# Patient Record
Sex: MALE | Race: OTHER | NOT HISPANIC OR LATINO
[De-identification: names, ages, dates, MRNs, and addresses within clinical notes are randomized per-mention and may not be internally consistent; named-entity substitution may affect disease eponyms.]

---

## 2017-02-07 ENCOUNTER — APPOINTMENT (OUTPATIENT)
Dept: ORTHOPEDIC SURGERY | Facility: CLINIC | Age: 22
End: 2017-02-07

## 2018-06-24 ENCOUNTER — EMERGENCY (EMERGENCY)
Facility: HOSPITAL | Age: 23
LOS: 1 days | Discharge: HOME | End: 2018-06-24
Attending: EMERGENCY MEDICINE | Admitting: EMERGENCY MEDICINE
Payer: MEDICAID

## 2018-06-24 VITALS
DIASTOLIC BLOOD PRESSURE: 55 MMHG | SYSTOLIC BLOOD PRESSURE: 119 MMHG | OXYGEN SATURATION: 98 % | RESPIRATION RATE: 16 BRPM | TEMPERATURE: 98 F | HEART RATE: 60 BPM

## 2018-06-24 PROCEDURE — 99283 EMERGENCY DEPT VISIT LOW MDM: CPT

## 2018-06-24 PROCEDURE — 73140 X-RAY EXAM OF FINGER(S): CPT | Mod: 26,RT

## 2018-06-24 RX ORDER — IBUPROFEN 200 MG
600 TABLET ORAL ONCE
Qty: 0 | Refills: 0 | Status: COMPLETED | OUTPATIENT
Start: 2018-06-24 | End: 2018-06-24

## 2018-06-24 RX ADMIN — Medication 600 MILLIGRAM(S): at 19:59

## 2018-06-24 NOTE — ED PROVIDER NOTE - PROGRESS NOTE DETAILS
LEONARDO Hackett: Received sign out from LEONARDO OATES to reassess patient and follow up on xr reports. XR w/o acute fx or dislocation, ace wrap applied. Pt given Motrin. Will dc w/ PMD and hand follow up if pain persists.

## 2018-06-24 NOTE — ED PROVIDER NOTE - CARE PLAN
Principal Discharge DX:	Thumb sprain  Assessment and plan of treatment:	See your primary care doctor within 24-48 hours. Follow up with hand for further evaluation if pain persists. May call the Admin PA for you results from 9-2:30, 7 days a week, at [phone]. Take Motrin 600mg every 8hrs with food for pain. Keep ace wrap on for comfort. Rest, ice and elevate. Return to the ER for worsening symptoms or any other concerns.

## 2018-06-24 NOTE — ED PROVIDER NOTE - OBJECTIVE STATEMENT
22y M with no PMHx presents to the ED for right thumb pain today. Pt states he was playing basketball when ball hit right thumb. No allergies

## 2018-06-24 NOTE — ED PROVIDER NOTE - PLAN OF CARE
See your primary care doctor within 24-48 hours. Follow up with hand for further evaluation if pain persists. May call the Admin PA for you results from 9-2:30, 7 days a week, at [phone]. Take Motrin 600mg every 8hrs with food for pain. Keep ace wrap on for comfort. Rest, ice and elevate. Return to the ER for worsening symptoms or any other concerns.

## 2019-02-15 ENCOUNTER — EMERGENCY (EMERGENCY)
Facility: HOSPITAL | Age: 24
LOS: 1 days | Discharge: HOME | End: 2019-02-15
Attending: PERSONAL EMERGENCY RESPONSE ATTENDANT | Admitting: PERSONAL EMERGENCY RESPONSE ATTENDANT
Payer: COMMERCIAL

## 2019-02-15 VITALS
TEMPERATURE: 98 F | RESPIRATION RATE: 16 BRPM | HEART RATE: 78 BPM | DIASTOLIC BLOOD PRESSURE: 65 MMHG | OXYGEN SATURATION: 98 % | SYSTOLIC BLOOD PRESSURE: 145 MMHG

## 2019-02-15 PROCEDURE — 99283 EMERGENCY DEPT VISIT LOW MDM: CPT | Mod: 25

## 2019-02-15 PROCEDURE — 29125 APPL SHORT ARM SPLINT STATIC: CPT | Mod: RT

## 2019-02-15 PROCEDURE — 73130 X-RAY EXAM OF HAND: CPT | Mod: 26,RT

## 2019-02-15 RX ORDER — IBUPROFEN 200 MG
600 TABLET ORAL ONCE
Qty: 0 | Refills: 0 | Status: COMPLETED | OUTPATIENT
Start: 2019-02-15 | End: 2019-02-15

## 2019-02-15 RX ORDER — ACETAMINOPHEN 500 MG
975 TABLET ORAL ONCE
Qty: 0 | Refills: 0 | Status: COMPLETED | OUTPATIENT
Start: 2019-02-15 | End: 2019-02-15

## 2019-02-15 RX ADMIN — Medication 600 MILLIGRAM(S): at 17:07

## 2019-02-15 RX ADMIN — Medication 975 MILLIGRAM(S): at 17:07

## 2019-02-15 NOTE — ED PROVIDER NOTE - PHYSICAL EXAMINATION
Pt's R index finger is held extended in 90 degrees extension at MCP joint w/ inability to flex at PIP or DIP. Intact distal sensation and cap refill of this digit. No gross bony deformity. No other injuries sustained. Pt's tenderness localizes to proximal phalanx and distal metacarpal of the 2nd digit of his R hand. No skin openings or bruising. Mild soft tissue swelling noted over proximal phalanx. Unable to passively or actively range at the PIP. On further discussion, pt endorses history of (eligiment) of this digit proximal to current site of injury that leaves with inability to flex at the DIP at baseline. Pt's R index finger is held extended in 90 degrees extension at MCP joint w/ inability to flex at PIP or DIP. Intact distal sensation and cap refill of this digit. No gross bony deformity. No other injuries sustained. Pt's tenderness localizes to proximal phalanx and distal metacarpal of the 2nd digit of his R hand. No skin openings or bruising. Mild soft tissue swelling noted over proximal phalanx. Unable to passively or actively range at the PIP. On further discussion, pt endorses history of injury to 'ligament' of this digit proximal to current site of injury that leaves him with inability to flex at the DIP at baseline.

## 2019-02-15 NOTE — ED PROVIDER NOTE - ATTENDING CONTRIBUTION TO CARE
Agree with above.  HPI and initial care provided by me.  Pt signed out to PA for follow-up of imaging/poss hand consult.

## 2019-02-15 NOTE — ED PROVIDER NOTE - NSFOLLOWUPINSTRUCTIONS_ED_ALL_ED_FT
Follow up with your Primary Medical Doctor in 1-2 days.  Follow up with Hand surgeon in 1-2 days see attached list.  Rest.  Ice 3-4 x a day,  Elevated hand.  Keep splint clean and dry.  Take Ibuprofen 600mg orally every 8 hours as needed for pain take with food.  Return to the ER for any persistent/worsening or new symptoms numbness, tingling or any concerning symptoms.

## 2019-02-15 NOTE — ED PROVIDER NOTE - UPPER EXTREMITY EXAM, RIGHT
Pt's R index finger is held extended in 90 degrees extension at MCP joint w/ inability to flex at PIP or DIP. Intact distal sensation and cap refill of this digit. No gross bony deformity. No other injuries sustained. Pt's tenderness localizes to proximal phalanx and distal metacarpal of the 2nd digit of his R hand. No skin openings or bruising. Mild soft tissue swelling noted over proximal phalanx. Unable to passively or actively range at the PIP.

## 2019-02-15 NOTE — ED PROVIDER NOTE - OBJECTIVE STATEMENT
24 y/o M w/ no pertinent PMH, presents to ED c/o pain to R index finger s/p injury today s/p playing basketball approximately 1 hour ago. Pt's R index finger is held extended in 90 degrees extension at MCP joint w/ inability to flex at PIP or DIP. Intact distal sensation and cap refill of this digit. No gross bony deformity. No other injuries sustained. Pt's tenderness localizes to proximal phalanx and distal metacarpal of the 2nd digit of his R hand. No skin openings or bruising. Mild soft tissue swelling noted over proximal phalanx. Unable to passively or actively range at the PIP. On further discussion, pt endorses history of (ligament) of this digit proximal to current site of injury that leaves with inability to flex at the DIP at baseline. Pt is R handed. Has not taken any pain medication. Denies any other pain, or any other acute complaints. NKDA. No daily meds. 22 y/o M w/ no pertinent PMH, presents to ED c/o pain to R index finger s/p injury today s/p playing basketball approximately 1 hour ago. Pt's R index finger is held extended in 90 degrees extension at MCP joint w/ inability to flex at PIP or DIP. Intact distal sensation and cap refill of this digit. No gross bony deformity. No other injuries sustained. Pt's tenderness localizes to proximal phalanx and distal metacarpal of the 2nd digit of his R hand. No skin openings or bruising. Mild soft tissue swelling noted over proximal phalanx. Unable to passively or actively range at the PIP. On further discussion, pt endorses history of (ligament) of this digit proximal to current site of injury that leaves with inability to flex at the DIP at baseline. Pt is R handed. Has not taken any pain medication. Denies any other pain, or any other acute complaints. NKDA. No daily meds.  Has not taken any medication for pain prior to arrival.

## 2019-02-15 NOTE — ED PROVIDER NOTE - PROGRESS NOTE DETAILS
JESSICA Mcknight:(QUID PA) pt feels better ambulating without difficulty.  Pt with fx on x-ray, splint placed see procedure note, pt advised of the importance of follow up with hand specialist.  Results reviewed with patient.  Discharge reviewed and discussed with patient.

## 2019-02-15 NOTE — ED PROVIDER NOTE - CLINICAL SUMMARY MEDICAL DECISION MAKING FREE TEXT BOX
Attending MD Florentino.  Tylenol/motrin ordered for initial pain management on arrival.   XR imaging ordered to assess for dislocation vs. fx vs. poss ligamentous/soft tissue injury or reinjury 2/2 sig motor deficits that exceed pt's baseline motor defitis of this digit.  Cap refill and sensation intact distal to site.

## 2019-02-15 NOTE — ED PROVIDER NOTE - NS_ ATTENDINGSCRIBEDETAILS _ED_A_ED_FT
Attending MD Florentino.  Agree with above.  PT seen and assessed with scribe assistance in documentation in real time.

## 2019-02-15 NOTE — ED ADULT TRIAGE NOTE - CHIEF COMPLAINT QUOTE
Injured right index finger playing basket ball one hour ago  swelling noted to right index finger + radial pulse noted

## 2019-02-16 ENCOUNTER — EMERGENCY (EMERGENCY)
Facility: HOSPITAL | Age: 24
LOS: 1 days | Discharge: HOME | End: 2019-02-16
Attending: EMERGENCY MEDICINE | Admitting: EMERGENCY MEDICINE
Payer: COMMERCIAL

## 2019-02-16 VITALS
SYSTOLIC BLOOD PRESSURE: 135 MMHG | HEART RATE: 99 BPM | DIASTOLIC BLOOD PRESSURE: 66 MMHG | OXYGEN SATURATION: 99 % | TEMPERATURE: 98 F | RESPIRATION RATE: 16 BRPM

## 2019-02-16 PROCEDURE — 99283 EMERGENCY DEPT VISIT LOW MDM: CPT

## 2019-02-16 RX ORDER — OXYCODONE HYDROCHLORIDE 5 MG/1
5 TABLET ORAL ONCE
Qty: 0 | Refills: 0 | Status: DISCONTINUED | OUTPATIENT
Start: 2019-02-16 | End: 2019-02-16

## 2019-02-16 RX ADMIN — OXYCODONE HYDROCHLORIDE 5 MILLIGRAM(S): 5 TABLET ORAL at 23:46

## 2019-02-16 NOTE — ED PROVIDER NOTE - MUSCULOSKELETAL, MLM
+R wrist in splint. Pt has cap refill less than 2 seconds. Sensation intact in all fingers. Pt able to move all fingers without difficulty or pain. No erythema or cyanosis of fingers. FROM of all joints.

## 2019-02-16 NOTE — ED PROVIDER NOTE - CHPI ED SYMPTOMS NEG
No chills, no muscle dysfunction of extremities, no paresthesias, no chest pain, no shortness of breath, no headache, no dizziness/no fever/no numbness
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
## 2019-02-16 NOTE — ED ADULT TRIAGE NOTE - CHIEF COMPLAINT QUOTE
pt was seen here yesterday for fractured index finger and states pain has gotten worse and he cannot tolerate it. denies numbness or tingling, cap refill <2 seconds, comfortable in triage. no pmh

## 2019-02-16 NOTE — ED PROVIDER NOTE - NSFOLLOWUPINSTRUCTIONS_ED_ALL_ED_FT
1) Follow up with your doctor within 1 week.  2) Return to the ER immediately for new or worsening symptoms including uncontrolled pain  3) Take ibuprofen 600mg every 6 hours for the next 2 days, then every 6 hours as needed for pain.  Keep the leg elevated as much as possible.  Apply ice pack 20 min on, 20 min off, alternating today.  Use aircast and crutches for comfort.   4) Please take Percocet for severe pain. Do not operate heavy machinery or Drive when you take percocet as will make you drowsy

## 2019-10-29 ENCOUNTER — EMERGENCY (EMERGENCY)
Facility: HOSPITAL | Age: 24
LOS: 1 days | Discharge: HOME | End: 2019-10-29
Attending: EMERGENCY MEDICINE | Admitting: EMERGENCY MEDICINE
Payer: SELF-PAY

## 2019-10-29 VITALS
OXYGEN SATURATION: 100 % | SYSTOLIC BLOOD PRESSURE: 128 MMHG | RESPIRATION RATE: 18 BRPM | TEMPERATURE: 99 F | DIASTOLIC BLOOD PRESSURE: 44 MMHG | HEART RATE: 75 BPM

## 2019-10-29 VITALS
DIASTOLIC BLOOD PRESSURE: 89 MMHG | HEART RATE: 91 BPM | RESPIRATION RATE: 17 BRPM | OXYGEN SATURATION: 98 % | TEMPERATURE: 98 F | SYSTOLIC BLOOD PRESSURE: 151 MMHG

## 2019-10-29 PROCEDURE — 99284 EMERGENCY DEPT VISIT MOD MDM: CPT

## 2019-10-29 PROCEDURE — 70486 CT MAXILLOFACIAL W/O DYE: CPT | Mod: 26

## 2019-10-29 RX ORDER — IBUPROFEN 200 MG
1 TABLET ORAL
Qty: 30 | Refills: 0
Start: 2019-10-29

## 2019-10-29 RX ORDER — IBUPROFEN 200 MG
800 TABLET ORAL ONCE
Refills: 0 | Status: COMPLETED | OUTPATIENT
Start: 2019-10-29 | End: 2019-10-29

## 2019-10-29 RX ORDER — IBUPROFEN 200 MG
600 TABLET ORAL ONCE
Refills: 0 | Status: COMPLETED | OUTPATIENT
Start: 2019-10-29 | End: 2019-10-29

## 2019-10-29 RX ADMIN — Medication 600 MILLIGRAM(S): at 12:36

## 2019-10-29 RX ADMIN — Medication 800 MILLIGRAM(S): at 21:16
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
## 2019-10-29 NOTE — ED PROVIDER NOTE - CARE PLAN
Principal Discharge DX:	Closed fracture of nasal bone, initial encounter  Secondary Diagnosis:	Open fracture of tooth, initial encounter

## 2019-10-29 NOTE — ED PROVIDER NOTE - TOOTH FINDINGS
Tooth #8 with LS class 3 fx and feels loose. #24 with class 2 fracture. #25 and 26 with class 3 fx. Active bleeding from #26. No malocclusion. Rest of oropharynx normal.

## 2019-10-29 NOTE — ED PROVIDER NOTE - PROGRESS NOTE DETAILS
Pt evaluated by dental, CT performed, [employer] present to take report and SW spoke with pt. Family updated on CT delays and plan. Pt to be DCd after dental applies pain medication.

## 2019-10-29 NOTE — ED PROVIDER NOTE - PATIENT PORTAL LINK FT
You can access the FollowMyHealth Patient Portal offered by [facility] by registering at the following website: [email or web address]. By joining [facility]’s FollowMyHealth portal, you will also be able to view your health information using other applications (apps) compatible with our system.

## 2019-10-29 NOTE — ED PROVIDER NOTE - NOSE FINDINGS
Mild edema of distal nose with dried blood in nares, no active bleeding. Right nare shows excoriations and fresh blood. Left is normal.
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
## 2019-10-29 NOTE — ED PROVIDER NOTE - NSFOLLOWUPINSTRUCTIONS_ED_ALL_ED_FT
Please follow up with our dental clinic  for appointment, or another dentist of your choice.  You have nose fractures on both sides. Please follow up with an ENT list given.  Motrin or Tylenol for pain.  Rest.  Return if any worsening symptoms.
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
## 2020-11-24 NOTE — ED PROVIDER NOTE - MUSCULOSKELETAL NECK EXAM
From: Mary Casey  To: Wade David MD  Sent: 11/24/2020 9:56 AM EST  Subject: Non-Urgent Medical Question     [facility]. I didnt get a notice that your new message was posted for me. First: I have four days of Colchicine remaining w/ no refills. Please submit a new order @ [facility]. Ill pick it up later this week. Justin Plank been taking 300 mg Allopurinol since 10/11/20; Ill increase to 450 mg right away. I will plan to repeat the Uric Acid test on 12/14/20. Unless I hear otherwise, I will be at [facility] (3rd floor) around 8:30 a.m. Lastly, I havent had a gout outbreak since our last visit, late August.    Reinier Shearer  [phone]  [email or web address]
no deformity, pain or tenderness. no restriction of movement

## 2022-05-06 NOTE — ED PROVIDER NOTE - NEUROLOGICAL, MLM
Spontaneous, unlabored and symmetrical Alert and oriented, no focal deficits, no motor or sensory deficits.
